# Patient Record
Sex: FEMALE | Race: WHITE | NOT HISPANIC OR LATINO | Employment: UNEMPLOYED | URBAN - METROPOLITAN AREA
[De-identification: names, ages, dates, MRNs, and addresses within clinical notes are randomized per-mention and may not be internally consistent; named-entity substitution may affect disease eponyms.]

---

## 2018-02-22 ENCOUNTER — HISTORICAL (OUTPATIENT)
Dept: ADMINISTRATIVE | Facility: HOSPITAL | Age: 23
End: 2018-02-22

## 2019-06-24 ENCOUNTER — HISTORICAL (OUTPATIENT)
Dept: ADMINISTRATIVE | Facility: HOSPITAL | Age: 24
End: 2019-06-24

## 2019-10-22 ENCOUNTER — HISTORICAL (OUTPATIENT)
Dept: ADMINISTRATIVE | Facility: HOSPITAL | Age: 24
End: 2019-10-22

## 2019-10-23 ENCOUNTER — HISTORICAL (OUTPATIENT)
Dept: FAMILY MEDICINE | Facility: CLINIC | Age: 24
End: 2019-10-23

## 2019-10-23 LAB
ABS NEUT (OLG): 4.77 X10(3)/MCL (ref 2.1–9.2)
ALBUMIN SERPL-MCNC: 4 GM/DL (ref 3.4–5)
ALBUMIN/GLOB SERPL: 1 RATIO (ref 1.1–2)
ALP SERPL-CCNC: 78 UNIT/L (ref 45–117)
ALT SERPL-CCNC: 25 UNIT/L (ref 12–78)
AST SERPL-CCNC: 20 UNIT/L (ref 15–37)
BASOPHILS # BLD AUTO: 0 X10(3)/MCL (ref 0–0.2)
BASOPHILS NFR BLD AUTO: 1 %
BILIRUB SERPL-MCNC: 0.4 MG/DL (ref 0.2–1)
BILIRUBIN DIRECT+TOT PNL SERPL-MCNC: 0.1 MG/DL (ref 0–0.2)
BILIRUBIN DIRECT+TOT PNL SERPL-MCNC: 0.3 MG/DL
BUN SERPL-MCNC: 12 MG/DL (ref 7–18)
CALCIUM SERPL-MCNC: 9.4 MG/DL (ref 8.5–10.1)
CHLORIDE SERPL-SCNC: 106 MMOL/L (ref 98–107)
CHOLEST SERPL-MCNC: 149 MG/DL
CHOLEST/HDLC SERPL: 3.6 {RATIO} (ref 0–4.4)
CO2 SERPL-SCNC: 26 MMOL/L (ref 21–32)
CREAT SERPL-MCNC: 0.8 MG/DL (ref 0.6–1.3)
CRP SERPL HS-MCNC: 9.1 MG/L (ref 0–3)
EOSINOPHIL # BLD AUTO: 0.2 X10(3)/MCL (ref 0–0.9)
EOSINOPHIL NFR BLD AUTO: 3 %
ERYTHROCYTE [DISTWIDTH] IN BLOOD BY AUTOMATED COUNT: 12 % (ref 11.5–14.5)
ERYTHROCYTE [SEDIMENTATION RATE] IN BLOOD: 12 MM/HR (ref 0–20)
EST. AVERAGE GLUCOSE BLD GHB EST-MCNC: 120 MG/DL
GLOBULIN SER-MCNC: 4.2 GM/ML (ref 2.3–3.5)
GLUCOSE SERPL-MCNC: 91 MG/DL (ref 74–106)
HBA1C MFR BLD: 5.8 % (ref 4.2–6.3)
HCT VFR BLD AUTO: 46.6 % (ref 35–46)
HDLC SERPL-MCNC: 41 MG/DL (ref 40–59)
HGB BLD-MCNC: 15.3 GM/DL (ref 12–16)
IMM GRANULOCYTES # BLD AUTO: 0.02 10*3/UL
IMM GRANULOCYTES NFR BLD AUTO: 0 %
LDLC SERPL CALC-MCNC: 78 MG/DL
LYMPHOCYTES # BLD AUTO: 2.2 X10(3)/MCL (ref 0.6–4.6)
LYMPHOCYTES NFR BLD AUTO: 28 %
MCH RBC QN AUTO: 29.9 PG (ref 26–34)
MCHC RBC AUTO-ENTMCNC: 32.8 GM/DL (ref 31–37)
MCV RBC AUTO: 91 FL (ref 80–100)
MONOCYTES # BLD AUTO: 0.5 X10(3)/MCL (ref 0.1–1.3)
MONOCYTES NFR BLD AUTO: 7 %
NEUTROPHILS # BLD AUTO: 4.77 X10(3)/MCL (ref 2.1–9.2)
NEUTROPHILS NFR BLD AUTO: 61 %
PLATELET # BLD AUTO: 263 X10(3)/MCL (ref 130–400)
PMV BLD AUTO: 9.4 FL (ref 7.4–10.4)
POTASSIUM SERPL-SCNC: 3.6 MMOL/L (ref 3.5–5.1)
PROT SERPL-MCNC: 8.2 GM/DL (ref 6.4–8.2)
RBC # BLD AUTO: 5.12 X10(6)/MCL (ref 4–5.2)
SODIUM SERPL-SCNC: 140 MMOL/L (ref 136–145)
T4 FREE SERPL-MCNC: 0.92 NG/DL (ref 0.76–1.46)
TRIGL SERPL-MCNC: 150 MG/DL
TSH SERPL-ACNC: 1.72 MIU/L (ref 0.36–3.74)
VLDLC SERPL CALC-MCNC: 30 MG/DL
WBC # SPEC AUTO: 7.8 X10(3)/MCL (ref 4.5–11)

## 2020-09-14 LAB — POC BETA-HCG (QUAL): NEGATIVE

## 2020-09-15 ENCOUNTER — HISTORICAL (OUTPATIENT)
Dept: ADMINISTRATIVE | Facility: HOSPITAL | Age: 25
End: 2020-09-15

## 2020-09-15 LAB
ABS NEUT (OLG): 3.9 X10(3)/MCL (ref 2.1–9.2)
ALBUMIN SERPL-MCNC: 3.9 GM/DL (ref 3.4–5)
ALBUMIN/GLOB SERPL: 0.9 RATIO (ref 1.1–2)
ALP SERPL-CCNC: 67 UNIT/L (ref 45–117)
ALT SERPL-CCNC: 43 UNIT/L (ref 12–78)
AST SERPL-CCNC: 31 UNIT/L (ref 15–37)
BASOPHILS # BLD AUTO: 0.1 X10(3)/MCL (ref 0–0.2)
BASOPHILS NFR BLD AUTO: 1 %
BILIRUB SERPL-MCNC: 0.5 MG/DL (ref 0.2–1)
BILIRUBIN DIRECT+TOT PNL SERPL-MCNC: 0.1 MG/DL (ref 0–0.2)
BILIRUBIN DIRECT+TOT PNL SERPL-MCNC: 0.4 MG/DL
BUN SERPL-MCNC: 11 MG/DL (ref 7–18)
CALCIUM SERPL-MCNC: 9.3 MG/DL (ref 8.5–10.1)
CHLORIDE SERPL-SCNC: 106 MMOL/L (ref 98–107)
CHOLEST SERPL-MCNC: 160 MG/DL
CHOLEST/HDLC SERPL: 4.2 {RATIO} (ref 0–4.4)
CO2 SERPL-SCNC: 27 MMOL/L (ref 21–32)
CREAT SERPL-MCNC: 0.8 MG/DL (ref 0.6–1.3)
EOSINOPHIL # BLD AUTO: 0.1 X10(3)/MCL (ref 0–0.9)
EOSINOPHIL NFR BLD AUTO: 2 %
ERYTHROCYTE [DISTWIDTH] IN BLOOD BY AUTOMATED COUNT: 12.3 % (ref 11.5–14.5)
GLOBULIN SER-MCNC: 4.2 GM/ML (ref 2.3–3.5)
GLUCOSE SERPL-MCNC: 79 MG/DL (ref 74–106)
HCT VFR BLD AUTO: 44.4 % (ref 35–46)
HDLC SERPL-MCNC: 38 MG/DL (ref 40–59)
HGB BLD-MCNC: 14.5 GM/DL (ref 12–16)
IMM GRANULOCYTES # BLD AUTO: 0.02 10*3/UL
IMM GRANULOCYTES NFR BLD AUTO: 0 %
LDLC SERPL CALC-MCNC: 92 MG/DL
LYMPHOCYTES # BLD AUTO: 2.5 X10(3)/MCL (ref 0.6–4.6)
LYMPHOCYTES NFR BLD AUTO: 34 %
MCH RBC QN AUTO: 29.7 PG (ref 26–34)
MCHC RBC AUTO-ENTMCNC: 32.7 GM/DL (ref 31–37)
MCV RBC AUTO: 90.8 FL (ref 80–100)
MONOCYTES # BLD AUTO: 0.7 X10(3)/MCL (ref 0.1–1.3)
MONOCYTES NFR BLD AUTO: 9 %
NEUTROPHILS # BLD AUTO: 3.9 X10(3)/MCL (ref 2.1–9.2)
NEUTROPHILS NFR BLD AUTO: 54 %
PLATELET # BLD AUTO: 281 X10(3)/MCL (ref 130–400)
PMV BLD AUTO: 9.3 FL (ref 7.4–10.4)
POTASSIUM SERPL-SCNC: 3.5 MMOL/L (ref 3.5–5.1)
PROT SERPL-MCNC: 8.1 GM/DL (ref 6.4–8.2)
RBC # BLD AUTO: 4.89 X10(6)/MCL (ref 4–5.2)
SODIUM SERPL-SCNC: 142 MMOL/L (ref 136–145)
T4 FREE SERPL-MCNC: 1.16 NG/DL (ref 0.76–1.46)
TRIGL SERPL-MCNC: 152 MG/DL
TSH SERPL-ACNC: 2.19 MIU/L (ref 0.36–3.74)
VLDLC SERPL CALC-MCNC: 30 MG/DL
WBC # SPEC AUTO: 7.3 X10(3)/MCL (ref 4.5–11)

## 2020-10-27 ENCOUNTER — HISTORICAL (OUTPATIENT)
Dept: ADMINISTRATIVE | Facility: HOSPITAL | Age: 25
End: 2020-10-27

## 2020-10-27 LAB — CORTIS SERPL-SCNC: 4.8 UG/DL

## 2020-10-28 ENCOUNTER — HISTORICAL (OUTPATIENT)
Dept: FAMILY MEDICINE | Facility: CLINIC | Age: 25
End: 2020-10-28

## 2020-10-29 ENCOUNTER — HISTORICAL (OUTPATIENT)
Dept: ADMINISTRATIVE | Facility: HOSPITAL | Age: 25
End: 2020-10-29

## 2020-10-29 LAB — CORTIS SERPL-SCNC: <1 UG/DL

## 2020-11-10 ENCOUNTER — HISTORICAL (OUTPATIENT)
Dept: RADIOLOGY | Facility: HOSPITAL | Age: 25
End: 2020-11-10

## 2021-01-27 ENCOUNTER — HISTORICAL (OUTPATIENT)
Dept: ADMINISTRATIVE | Facility: HOSPITAL | Age: 26
End: 2021-01-27

## 2021-01-27 LAB
ABS NEUT (OLG): 4.22 X10(3)/MCL (ref 2.1–9.2)
ALBUMIN SERPL-MCNC: 4.3 GM/DL (ref 3.5–5)
ALBUMIN/GLOB SERPL: 1 RATIO (ref 1.1–2)
ALP SERPL-CCNC: 79 UNIT/L (ref 40–150)
ALT SERPL-CCNC: 42 UNIT/L (ref 0–55)
AST SERPL-CCNC: 30 UNIT/L (ref 5–34)
BASOPHILS # BLD AUTO: 0.1 X10(3)/MCL (ref 0–0.2)
BASOPHILS NFR BLD AUTO: 1 %
BILIRUB SERPL-MCNC: 0.4 MG/DL
BILIRUBIN DIRECT+TOT PNL SERPL-MCNC: 0.2 MG/DL (ref 0–0.5)
BILIRUBIN DIRECT+TOT PNL SERPL-MCNC: 0.2 MG/DL (ref 0–0.8)
BUN SERPL-MCNC: 12 MG/DL (ref 7–18.7)
CALCIUM SERPL-MCNC: 10 MG/DL (ref 8.4–10.2)
CHLORIDE SERPL-SCNC: 101 MMOL/L (ref 98–107)
CO2 SERPL-SCNC: 27 MMOL/L (ref 22–29)
CREAT SERPL-MCNC: 0.76 MG/DL (ref 0.55–1.02)
EOSINOPHIL # BLD AUTO: 0.2 X10(3)/MCL (ref 0–0.9)
EOSINOPHIL NFR BLD AUTO: 3 %
ERYTHROCYTE [DISTWIDTH] IN BLOOD BY AUTOMATED COUNT: 12.6 % (ref 11.5–14.5)
GLOBULIN SER-MCNC: 4.1 GM/DL (ref 2.4–3.5)
GLUCOSE SERPL-MCNC: 70 MG/DL (ref 74–100)
HCT VFR BLD AUTO: 47.3 % (ref 35–46)
HGB BLD-MCNC: 15.2 GM/DL (ref 12–16)
IMM GRANULOCYTES # BLD AUTO: 0.04 10*3/UL
IMM GRANULOCYTES NFR BLD AUTO: 0 %
LYMPHOCYTES # BLD AUTO: 3 X10(3)/MCL (ref 0.6–4.6)
LYMPHOCYTES NFR BLD AUTO: 37 %
MCH RBC QN AUTO: 29.2 PG (ref 26–34)
MCHC RBC AUTO-ENTMCNC: 32.1 GM/DL (ref 31–37)
MCV RBC AUTO: 90.8 FL (ref 80–100)
MONOCYTES # BLD AUTO: 0.7 X10(3)/MCL (ref 0.1–1.3)
MONOCYTES NFR BLD AUTO: 8 %
NEUTROPHILS # BLD AUTO: 4.22 X10(3)/MCL (ref 2.1–9.2)
NEUTROPHILS NFR BLD AUTO: 51 %
PLATELET # BLD AUTO: 303 X10(3)/MCL (ref 130–400)
PMV BLD AUTO: 9.7 FL (ref 7.4–10.4)
POTASSIUM SERPL-SCNC: 3.4 MMOL/L (ref 3.5–5.1)
PROT SERPL-MCNC: 8.4 GM/DL (ref 6.4–8.3)
RBC # BLD AUTO: 5.21 X10(6)/MCL (ref 4–5.2)
SODIUM SERPL-SCNC: 141 MMOL/L (ref 136–145)
WBC # SPEC AUTO: 8.2 X10(3)/MCL (ref 4.5–11)

## 2021-02-03 ENCOUNTER — HISTORICAL (OUTPATIENT)
Dept: ADMINISTRATIVE | Facility: HOSPITAL | Age: 26
End: 2021-02-03

## 2021-02-03 LAB
ALBUMIN SERPL-MCNC: 3.9 GM/DL (ref 3.5–5)
ALBUMIN/GLOB SERPL: 1.1 RATIO (ref 1.1–2)
ALP SERPL-CCNC: 69 UNIT/L (ref 40–150)
ALT SERPL-CCNC: 24 UNIT/L (ref 0–55)
AST SERPL-CCNC: 20 UNIT/L (ref 5–34)
BILIRUB SERPL-MCNC: 0.3 MG/DL
BILIRUBIN DIRECT+TOT PNL SERPL-MCNC: 0.1 MG/DL (ref 0–0.5)
BILIRUBIN DIRECT+TOT PNL SERPL-MCNC: 0.2 MG/DL (ref 0–0.8)
BUN SERPL-MCNC: 19 MG/DL (ref 7–18.7)
CALCIUM SERPL-MCNC: 9.1 MG/DL (ref 8.4–10.2)
CHLORIDE SERPL-SCNC: 102 MMOL/L (ref 98–107)
CO2 SERPL-SCNC: 29 MMOL/L (ref 22–29)
CREAT SERPL-MCNC: 0.72 MG/DL (ref 0.55–1.02)
EST CREAT CLEARANCE SER (OHS): 69.22 ML/MIN
GLOBULIN SER-MCNC: 3.4 GM/DL (ref 2.4–3.5)
GLUCOSE SERPL-MCNC: 63 MG/DL (ref 74–100)
HAV IGM SERPL QL IA: NONREACTIVE
HBV CORE IGM SERPL QL IA: NONREACTIVE
HBV SURFACE AG SERPL QL IA: NONREACTIVE
HCV AB SERPL QL IA: NONREACTIVE
NEG CONT SPOT COUNT: NORMAL
PANEL A SPOT COUNT: 0
PANEL B SPOT COUNT: 0
POS CONT SPOT COUNT: NORMAL
POTASSIUM SERPL-SCNC: 3.5 MMOL/L (ref 3.5–5.1)
PROT SERPL-MCNC: 7.3 GM/DL (ref 6.4–8.3)
SODIUM SERPL-SCNC: 141 MMOL/L (ref 136–145)
T-SPOT.TB: NORMAL

## 2021-07-13 ENCOUNTER — HISTORICAL (OUTPATIENT)
Dept: ADMINISTRATIVE | Facility: HOSPITAL | Age: 26
End: 2021-07-13

## 2021-07-13 LAB
ABS NEUT (OLG): 5.36 X10(3)/MCL (ref 2.1–9.2)
BASOPHILS # BLD AUTO: 0.1 X10(3)/MCL (ref 0–0.2)
BASOPHILS NFR BLD AUTO: 1 %
CRP SERPL HS-MCNC: 1.22 MG/DL
DEPRECATED CALCIDIOL+CALCIFEROL SERPL-MC: 27.7 NG/ML (ref 30–80)
EOSINOPHIL # BLD AUTO: 0.3 X10(3)/MCL (ref 0–0.9)
EOSINOPHIL NFR BLD AUTO: 3 %
ERYTHROCYTE [DISTWIDTH] IN BLOOD BY AUTOMATED COUNT: 12 % (ref 11.5–17)
ERYTHROCYTE [SEDIMENTATION RATE] IN BLOOD: 16 MM/HR (ref 0–20)
HBV SURFACE AG SERPL QL IA: NONREACTIVE
HCT VFR BLD AUTO: 42.7 % (ref 37–47)
HCV AB SERPL QL IA: NONREACTIVE
HGB BLD-MCNC: 14.1 GM/DL (ref 12–16)
LYMPHOCYTES # BLD AUTO: 2.7 X10(3)/MCL (ref 0.6–4.6)
LYMPHOCYTES NFR BLD AUTO: 29 %
MCH RBC QN AUTO: 29.8 PG (ref 27–31)
MCHC RBC AUTO-ENTMCNC: 33 GM/DL (ref 33–36)
MCV RBC AUTO: 90.3 FL (ref 80–94)
MONOCYTES # BLD AUTO: 0.8 X10(3)/MCL (ref 0.1–1.3)
MONOCYTES NFR BLD AUTO: 8 %
NEUTROPHILS # BLD AUTO: 5.36 X10(3)/MCL (ref 2.1–9.2)
NEUTROPHILS NFR BLD AUTO: 58 %
PLATELET # BLD AUTO: 282 X10(3)/MCL (ref 130–400)
PMV BLD AUTO: 9.6 FL (ref 9.4–12.4)
RBC # BLD AUTO: 4.73 X10(6)/MCL (ref 4.2–5.4)
RHEUMATOID FACT SERPL-ACNC: <13 IU/ML
WBC # SPEC AUTO: 9.2 X10(3)/MCL (ref 4.5–11.5)

## 2021-10-18 ENCOUNTER — HISTORICAL (OUTPATIENT)
Dept: ADMINISTRATIVE | Facility: HOSPITAL | Age: 26
End: 2021-10-18

## 2021-10-18 LAB
ABS NEUT (OLG): 3.82 X10(3)/MCL (ref 2.1–9.2)
ALBUMIN SERPL-MCNC: 4.3 GM/DL (ref 3.5–5)
ALBUMIN/GLOB SERPL: 1.4 RATIO (ref 1.1–2)
ALP SERPL-CCNC: 73 UNIT/L (ref 40–150)
ALT SERPL-CCNC: 37 UNIT/L (ref 0–55)
AST SERPL-CCNC: 33 UNIT/L (ref 5–34)
BASOPHILS # BLD AUTO: 0 X10(3)/MCL (ref 0–0.2)
BASOPHILS NFR BLD AUTO: 1 %
BILIRUB SERPL-MCNC: 0.3 MG/DL
BILIRUBIN DIRECT+TOT PNL SERPL-MCNC: 0.1 MG/DL (ref 0–0.5)
BILIRUBIN DIRECT+TOT PNL SERPL-MCNC: 0.2 MG/DL (ref 0–0.8)
BUN SERPL-MCNC: 16.4 MG/DL (ref 7–18.7)
CALCIUM SERPL-MCNC: 10.6 MG/DL (ref 8.4–10.2)
CHLORIDE SERPL-SCNC: 104 MMOL/L (ref 98–107)
CO2 SERPL-SCNC: 30 MMOL/L (ref 22–29)
CREAT SERPL-MCNC: 0.76 MG/DL (ref 0.55–1.02)
CRP SERPL HS-MCNC: 0.81 MG/DL
DEPRECATED CALCIDIOL+CALCIFEROL SERPL-MC: 33.3 NG/ML (ref 30–80)
EOSINOPHIL # BLD AUTO: 0.2 X10(3)/MCL (ref 0–0.9)
EOSINOPHIL NFR BLD AUTO: 3 %
ERYTHROCYTE [DISTWIDTH] IN BLOOD BY AUTOMATED COUNT: 12.8 % (ref 11.5–17)
ERYTHROCYTE [SEDIMENTATION RATE] IN BLOOD: 13 MM/HR (ref 0–20)
GLOBULIN SER-MCNC: 3.1 GM/DL (ref 2.4–3.5)
GLUCOSE SERPL-MCNC: 82 MG/DL (ref 74–100)
HCT VFR BLD AUTO: 43.6 % (ref 37–47)
HGB BLD-MCNC: 14.6 GM/DL (ref 12–16)
LYMPHOCYTES # BLD AUTO: 2.7 X10(3)/MCL (ref 0.6–4.6)
LYMPHOCYTES NFR BLD AUTO: 37 %
MCH RBC QN AUTO: 29.9 PG (ref 27–31)
MCHC RBC AUTO-ENTMCNC: 33.5 GM/DL (ref 33–36)
MCV RBC AUTO: 89.3 FL (ref 80–94)
MONOCYTES # BLD AUTO: 0.5 X10(3)/MCL (ref 0.1–1.3)
MONOCYTES NFR BLD AUTO: 7 %
NEUTROPHILS # BLD AUTO: 3.82 X10(3)/MCL (ref 2.1–9.2)
NEUTROPHILS NFR BLD AUTO: 52 %
PLATELET # BLD AUTO: 258 X10(3)/MCL (ref 130–400)
PMV BLD AUTO: 9.7 FL (ref 9.4–12.4)
POTASSIUM SERPL-SCNC: 4 MMOL/L (ref 3.5–5.1)
PROT SERPL-MCNC: 7.4 GM/DL (ref 6.4–8.3)
RBC # BLD AUTO: 4.88 X10(6)/MCL (ref 4.2–5.4)
RHEUMATOID FACT SERPL-ACNC: <13 IU/ML
SODIUM SERPL-SCNC: 144 MMOL/L (ref 136–145)
WBC # SPEC AUTO: 7.3 X10(3)/MCL (ref 4.5–11.5)

## 2022-04-10 ENCOUNTER — HISTORICAL (OUTPATIENT)
Dept: ADMINISTRATIVE | Facility: HOSPITAL | Age: 27
End: 2022-04-10
Payer: MEDICAID

## 2022-04-28 VITALS
WEIGHT: 159.81 LBS | OXYGEN SATURATION: 99 % | BODY MASS INDEX: 35.95 KG/M2 | DIASTOLIC BLOOD PRESSURE: 68 MMHG | HEIGHT: 56 IN | SYSTOLIC BLOOD PRESSURE: 102 MMHG
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
## 2022-05-03 NOTE — HISTORICAL [facility] CERNER
This is a historical note converted from Maurice. Formatting and pictures may have been removed.  Please reference Maurice for original formatting and attached multimedia. Chief Complaint  2 mo follow up lab results  History of Present Illness  Pt is a??25 year old female with a past medical history of hypoglycemia, multiple joint pain sites with positive?RF and ?obesity.?Discussed labs from? 1/27/2021 and XR right knee. Pt states she has not had any hypoglycemic episodes.  Polyarthralgia/ positive RF/ Falls: Pt states that her thigh gave out and she fell on 1/27/2021. Pt states she always has a sensation along the iliotibial band tightening. Pt saw [facility] on 2/2/2021. Reviewed [facility] note in agreement that pt should be seen by Rheumatology because likely rheumatological issue with elevated RF and C3 complement; pt recommended for PT 3x/week to start in 2 wks; and if not rheumatological issue, consider neurology. Pt states that she has fallen 2 -3 time since. Rt knee more swollen then left. Pt had meloxicam this morning with pain 3/10. Pt continues to have swelling of left MCP and?wrist greater than the?right.  Adjustment d/o with anxiety and depression:? She is at  majoring in biology with double minor in chemistry and ecology. ?Patient states that her counselor from [facility] health clinic?suspects that she has a diagnosis of PTSD, depression,?AVILA, panic?disorder hand?possible pseudoseizures. Pt has since seen psychiatrist and has been started on Zoloft 50 mg po daily. Reviewed PHQ 2 was0. Pt denies suicidal or homicidal ideations.  Patient denies chest pain, shortness of breath,?dyspnea on exertion, palpitations, peripheral edema,?abdominal pain, nausea, vomiting,?diarrhea,?constipation, fatigue, fever, chills,?dysuria,? hematuria, melena,?or hematochezia.  Review of Systems  GENERAL:?Negative for abnormal weight loss or weight gain, fatigue, fever, or chills. Negative except for stated in  HPI.  HEENT:??Negative for head trauma,?blurred vision, rhinorrhea, ?nasal congestion, sore throat or hearing deficit. Negative except for stated in HPI.  CARDIOVASCULAR: Negative for? chest pain, palpitations, BARTLETT, PND, orthopnea, peripheral edema or syncope. Negative except for stated in HPI.  RESPIRATORY: Negative for SOB, BARTLETT, cough or?wheezing.? Negative except for stated in HPI.  GASTROINTESTINAL: Negative for abdominal pain, nausea,??vomiting, diarrhea, constipation, heartburn, ?hematochezia? or melena.? Negative except for stated in HPI.  GENITOURINARY: Negative for dysuria, hematuria, urinary frequency, urinary urgency, urinary hesitancy or flank pain. Negative except for stated in HPI.  ENDOCRINE: Negative for fatigue, heat intolerance, cold intolerance, polyuria, polydipsia, or polyphagia.? Negative except for stated in HPI.  PSYCHOLOGICAL: Negative for depression, anxiety, suicidal or homicidal ideations, hallucinations??or?hanane.? Negative except for?stated in HPI.  MUSCULOSKELETAL:?? Negative except for?stated in HPI.  INTEGUMENT: Negative for rash?or lesions. Negative except for stated in HPI.  NEUROLOGY: Negative for headaches, dizziness, numbness, tingling,?vertigo?or gait disturbances. Negative except for stated in HPI.  Rest of 12 point ROS is negative except for HPI.  ?  Physical Exam  Vitals & Measurements  T:?36.6? ?C (Oral)? HR:?65(Peripheral)? RR:?18? BP:?100/68? SpO2:?99%?  HT:?142.00?cm? WT:?76.000?kg? BMI:?37.69? LMP:?11/28/2021 00:00 CST?  GENERAL:?Alert and oriented to person, place, time and situation,?NAD  HEENT: NCAT, Pupils equally round and reactive to light accommodation,?normal sclera, ?moist mucous membranes,?oropharynx normal,?normal nasal turbinates/ nares,??TM clear bilaterally, neck?supple,?thyroid normal,?no lymphadenopathy.  CARDIOVASCULAR:?Regular rateand?rhythm,? S1 and S2 normal;?no murmurs, no rubs, or no gallops; no carotid bruit.  RESPIRATORY:??Lungs clear to  auscultation bilaterally;?no wheezes,?no rhonchi,?or? no crackles  ABDOMEN: Soft/ Nontender/ Nondistended; Bowel Sounds x4 - normoactive; no abdominal pulsatile mass, no masses, no hernias  EXTREMITY:? No clubbing, no cyanosis and?no peripheral edema; Dorsalis pedis and tibial ?pulses 2+  MUSCULOSKELETAL: FROM of Upper and Lower extremities; Strength 5/5 of Upper and Lower extremities; right knee with slight swelling, non erythematous, stiffness to movement/ LROM on flexion; no signs/ symptoms of infection;? Left knee: FROM;? right? and Left MCP jts b/l with swelling and TTP ?with right greater than the left and right? radial styloid tenderness.  PSYCHOLOGICAL: Good judgement and insight; normal affect and mood.  NEUROLOGICAL: Normal gait and ?normal sensation;?no focal deficits; Cranial Nerves 2 -12 grossly intact  ?  Assessment/Plan  1.?Rheumatoid factor positive?R76.8  Suspected RA vs other differential- Autoimmune arthropathy, neuromuscular disorder, etc  Chronic issue/ requiring further work up  ?RF IgG and IGM are increasing and 2 times the normal limit from initial evaluation on?9/15/2020 to 10/27/2020.  KIMBERLEE and anti CCP ab both negative.  Discontinue ibuprofen 600mg po BID prn pain  Start Meloxicam 15 mg po daily with food and plenty of water  Continue Tylenol 1 g?p.o. twice daily as needed  Keep appointment with physical therapy once initiated  CMP ordered  Tspot? ordered  Hepatitis panel ordered  XR Rt knee (1/28/2021)- unremarkable;No acute abnormality identified.  XR right and left hand ordered  Ambulatory referral to Rheumatology Dr. Braden for RF positive and polyarthropathy submitted 12/3/2020.  Ordered:  Comprehensive Metabolic Panel, Routine collect, *Est. 02/03/21 3:00:00 CST, Blood, Order for future visit, *Est. Stop date 02/03/21 3:00:00 CST, Lab Collect, Rheumatoid factor positive, 02/03/21 10:53:00 CST  Hepatitis Panel-, Routine collect, 02/03/21 10:53:00 CST, Blood, Order for future visit,  Stop date 02/03/21 10:53:00 CST, Lab Collect, Rheumatoid factor positive, 02/03/21 10:53:00 CST  T Spot Test for M. tuberculosis, Routine collect, 02/03/21 10:53:00 CST, Blood, Order for future visit, Stop date 02/03/21 10:53:00 CST, Lab Collect, Rheumatoid factor positive, 02/03/21 10:53:00 CST  ?  2.?Polyarthropathy?M13.0  Chronic issue  ?RF IgG and IGM are increasing and 2 times the normal limit from initial evaluation on?9/15/2020 to 10/27/2020.  KIMBERLEE and anti CCP ab both negative.  Discontinue ibuprofen 600mg po BID prn pain  Start Meloxicam 15 mg po daily with food and plenty of water  Continue Tylenol 1 g?p.o. twice daily as needed  XR Rt knee (1/28/2021)- unremarkable;No acute abnormality identified.  XR right and left hand ordered  Start PT recommended by [facility]  Will continue to monitor  If falls continue, will consider neurology referral  Ordered:  XR Hand Left Minimum 3 Views, Routine, 02/03/21 10:51:00 CST, None, Ambulatory, Rad Type, Order for future visit, Polyarthropathy, Not Scheduled, 02/03/21 10:51:00 CST  XR Hand Right Minimum 3 Views, Routine, 02/03/21 10:52:00 CST, None, Ambulatory, Rad Type, Order for future visit, Polyarthropathy, Not Scheduled, 02/03/21 10:52:00 CST  ?  Orders:  meloxicam, 15 mg = 1 tab(s), Oral, Daily, # 30 tab(s), 5 Refill(s), Pharmacy: [id number], 142, cm, Height/Length Dosing, 02/03/21 9:01:00 CST, 76, kg, Weight Dosing, 02/03/21 9:01:00 CST  RTC in? 2 months for RF+ suspected RA and polyarthropathy  labs resulted after visit- Hep panel NR and Tspot negative; will inform pt at next visit   Problem List/Past Medical History  Ongoing  Allergic reaction to latex  Bilateral hip pain  Hypoglycemia  Obesity  Rheumatoid factor positive  Historical  No qualifying data  Procedure/Surgical History  None   Medications  EPINEPHrine 0.3 mg injectable kit, 0.3 mg= 0.3 mg, IM, Once  hydrOXYzine hydrochloride 25 mg oral tablet, 25 mg= 1 tab(s), Oral,  QID  melatonin 10 mg oral tablet, disintegrating, 10 mg= 1 tab(s), Oral, Once a day (at bedtime)  meloxicam 15 mg oral tablet, 15 mg= 1 tab(s), Oral, Daily, 5 refills  sertraline 50 mg oral tablet, 50 mg= 1 tab(s), Oral, qAM  Allergies  Latex  Silicone  Social History  Abuse/Neglect  No, No, Yes, 02/03/2021  Alcohol  Never, 07/06/2016  Employment/School  Student, 10/22/2019  Exercise  Exercise duration: 15. Exercise frequency: 1-2 times/week. Exercise type: Walking., 11/09/2020  Financial/Legal Situation  None, 09/14/2020  Home/Environment  Lives with Mother, Siblings. Living situation: Home/Independent., 10/22/2019    Never in , 09/14/2020  Nutrition/Health  Regular, Good, 10/22/2019  Sexual  Sexually active: No. Sexual orientation: Straight or heterosexual. Gender Identity Identifies as female. Other contraceptive use: none. No, 10/22/2019  Spiritual/Cultural  Agnostic, 11/09/2020  Substance Use  Never, 07/15/2016  Tobacco  Never (less than 100 in lifetime), N/A, 02/03/2021  Family History  ADHD - Attention deficit disorder with hyperactivity: Father.  Autistic disorder: Brother.  Dyslexia: Father and Brother.  Osteoarthritis: Mother.  Schizophrenic disorders: Brother.  Health Maintenance  Health Maintenance  ???Pending?(in the next year)  ??? ??OverDue  ??? ? ? ?Influenza Vaccine due??10/01/20??and every 1??day(s)  ??? ??Due?  ??? ? ? ?Cervical Cancer Screening due??02/03/21??Unknown Frequency  ??? ??Refused?  ??? ? ? ?Tetanus Vaccine due??02/03/21??and every 10??year(s)  ??? ??Due In Future?  ??? ? ? ?ADL Screening not due until??11/09/21??and every 1??year(s)  ??? ? ? ?Obesity Screening not due until??01/01/22??and every 1??year(s)  ??? ? ? ?Alcohol Misuse Screening not due until??01/02/22??and every 1??year(s)  ??? ? ? ?Diabetes Screening not due until??01/27/22??and every 1??year(s)  ???Satisfied?(in the past 1 year)  ??? ??Satisfied?  ??? ? ? ?ADL Screening on??11/09/20.??Satisfied by Marco  Tigist BROWN  ??? ? ? ?Alcohol Misuse Screening on??01/27/21.??Satisfied by Nicol March LPN  ??? ? ? ?Blood Pressure Screening on??02/03/21.??Satisfied by Fanta Catherine LPN  ??? ? ? ?Body Mass Index Check on??02/03/21.??Satisfied by Fanta Catherine LPN  ??? ? ? ?Depression Screening on??02/03/21.??Satisfied by Fanta Catherine LPN  ??? ? ? ?Diabetes Screening on??01/27/21.??Satisfied by Shad Beach  ??? ? ? ?Influenza Vaccine on??02/03/21.??Satisfied by Fanta Catherine LPN  ??? ? ? ?Obesity Screening on??02/03/21.??Satisfied by Fanta Catherine LPN  ??? ??Refused?  ??? ? ? ?Tetanus Vaccine on??10/27/20.??Recorded by Fanta Catherine LPN??Reason: Patient Refuses  ?  Lab Results  Test Name Test Result Date/Time Comments   Sodium Lvl 141 mmol/L 01/27/2021 10:00 CST    Potassium Lvl 3.4 mmol/L (Low) 01/27/2021 10:00 CST    Chloride 101 mmol/L 01/27/2021 10:00 CST    CO2 27 mmol/L 01/27/2021 10:00 CST    Calcium Lvl 10.0 mg/dL 01/27/2021 10:00 CST    Glucose Lvl 70 mg/dL (Low) 01/27/2021 10:00 CST    BUN 12.0 mg/dL 01/27/2021 10:00 CST    Creatinine 0.76 mg/dL 01/27/2021 10:00 CST    eGFR-AA >105 01/27/2021 10:00 CST    eGFR-ZACARIAS 100 mL/min/1.73 m2 01/27/2021 10:00 CST    Bili Total 0.4 mg/dL 01/27/2021 10:00 CST    Bili Direct 0.2 mg/dL 01/27/2021 10:00 CST    Bili Indirect 0.20 mg/dL 01/27/2021 10:00 CST    AST 30 unit/L 01/27/2021 10:00 CST    ALT 42 unit/L 01/27/2021 10:00 CST    Alk Phos 79 unit/L 01/27/2021 10:00 CST    Total Protein 8.4 gm/dL (High) 01/27/2021 10:00 CST    Albumin Lvl 4.3 gm/dL 01/27/2021 10:00 CST    Globulin 4.1 gm/dL (High) 01/27/2021 10:00 CST    A/G Ratio 1.0 ratio (Low) 01/27/2021 10:00 CST    WBC 8.2 x10(3)/mcL 01/27/2021 10:00 CST    RBC 5.21 x10(6)/mcL (High) 01/27/2021 10:00 CST    Hgb 15.2 gm/dL 01/27/2021 10:00 CST    Hct 47.3 % (High) 01/27/2021 10:00 CST    Platelet 303 x10(3)/mcL 01/27/2021 10:00 CST    MCV 90.8 fL 01/27/2021 10:00 CST    MCH 29.2 pg 01/27/2021 10:00 CST    MCHC 32.1  gm/dL 01/27/2021 10:00 CST    RDW 12.6 % 01/27/2021 10:00 CST    MPV 9.7 fL 01/27/2021 10:00 CST    Abs Neut 4.22 x10(3)/mcL 01/27/2021 10:00 CST    Neutro Auto 51 % 01/27/2021 10:00 CST    Lymph Auto 37 % 01/27/2021 10:00 CST    Mono Auto 8 % 01/27/2021 10:00 CST    Eos Auto 3 % 01/27/2021 10:00 CST    Abs Eos 0.2 x10(3)/mcL 01/27/2021 10:00 CST    Basophil Auto 1 % 01/27/2021 10:00 CST    Abs Neutro 4.22 x10(3)/mcL 01/27/2021 10:00 CST    Abs Lymph 3.0 x10(3)/mcL 01/27/2021 10:00 CST    Abs Mono 0.7 x10(3)/mcL 01/27/2021 10:00 CST    Abs Baso 0.1 x10(3)/mcL 01/27/2021 10:00 CST    IG% 0 % 01/27/2021 10:00 CST    IG# 0.040 01/27/2021 10:00 CST    KIMBERLEE IgG-ARUP None Detected 01/27/2021 10:00 CST ?  If suspicion of connective tissue disease is strong and KIMBERLEE  EIA is negative, consider testing for KIMBERLEE by IFA ([id number]).  INTERPRETIVE INFORMATION: Anti-Nuclear Antibodies (KIMBERLEE),  IgG by BOB  Antinuclear Antibodies (KIMBERLEE), IgG by BOB: KIMBERLEE specimens  are screened using enzyme-linked immunosorbent assay  (BOB) methodology. All BOB results reported as Detected  are further tested by indirect fluorescent assay (IFA)  using HEp-2 substrate with an IgG-specific conjugate. The  KIMBERLEE BOB screen is designed to detect antibodies against  dsDNA, histones, SS-A (Ro), SS-B (La), Patel, Patel/RNP,  Scl-70, Magda-1, centromeric proteins, other antigens  extracted from the HEp-2 cell nucleus. KIMBERLEE BOB assays  have been reported to have lower sensitivities than KIMBERLEE IFA  for systemic autoimmune rheumatic diseases (SARD).  Negative results do not necessarily rule out SARD.  Performed By: [facility]  [address] [place] [id number]  : Huma Berumen MD   Hep A IgM Nonreactive 02/03/2021 11:15 CST    Hep B Core IgM Nonreactive 02/03/2021 11:15 CST    Hep Bs Ag Nonreactive 02/03/2021 11:15 CST    Hep C Ab Nonreactive 02/03/2021 11:15 CST Interpretive Information (HCV):  ? ? ? ? ? ? ? ?Nonreactive: ?  ?Antibodies to HCV not detected.  ? ? ? ? ? ? ? ?Reactive: ? ? ? ?A reactive result should be followed by nucleic acid testing for  ? ? ? ? ? ? ? ? ?HCV RNA for identifying current hepatitis C virus(HCV) infection.  ? ? ? ? ? ? ? ?Equivocal: ? ? ?Another specimen should be obtained from patient for further testing.   RF IgA-ARUP 2 Units 01/27/2021 10:00 CST INTERPRETIVE INFORMATION: Rheumatoid Factor, IgA by BOB  The presence of all three rheumatoid factor (RF) isotypes  at abnormal levels has high specificity for a diagnosis of  rheumatoid arthritis (RA). However, the presence of RF  isotypes in any combination may be found in a variety of  conditions, including Sjogren syndrome and hepatitis  infections.   RF IgG-ARUP 13 Units (High) 01/27/2021 10:00 CST INTERPRETIVE INFORMATION: Rheumatoid Factor, IgG by BOB  The presence of all three rheumatoid factor (RF) isotypes  at abnormal levels has high specificity for a diagnosis of  rheumatoid arthritis (RA). However, the presence of RF  isotypes in any combination may be found in a variety of  conditions, including Sjogren syndrome and hepatitis  infections.  Performed By: [facility]  [address] [place] [id number]  : Huma Berumen MD   RF IgM-ARUP 14 Units (High) 01/27/2021 10:00 CST INTERPRETIVE INFORMATION: Rheumatoid Factor, IgM by BOB  The presence of all three rheumatoid factor (RF) isotypes  at abnormal levels has high specificity for a diagnosis of  rheumatoid arthritis (RA). However, the presence of RF  isotypes in any combination may be found in a variety of  conditions, including Sjogren syndrome and hepatitis  infections.   T-Spot.TB NEG, see scan 02/03/2021 11:15 CST Limitations (from T-SPOT.TB package insert, p.15)  Results from T-SPOT.TB testing must be used in conjunction with each individuals epidemiological history, current medical status and results of other diagnostic evaluation.  ?  The performance of  T-SPOT.TB has not been adequately evaluated with specimens from individuals younger than 17 yrs, in pregnant women, and in patients with hemophilia.  ?  A false positive result was obtained for T-SPOT.TB when tested with M.xenopi, M.kansasii and M.gordonae. ?While ESAT-6 and CFP10 antigens are absent from BCG strains of M.bovis and from most enviromental mycobacteria, it is possible that a positive T-SPOT.TB result may be due to infection with M.kansaii, M.szulgai, M.gordonae or M.marinum. ?Alternative tests would be required if these infections are suspected.  ?  A negative test result does not exclude the possibility of exposure to, or infection with M.tuberculosis. ?Patients with recet exposure to TB infected individuals exhibiting a negative T-SPOT.MB result should be considered for retesting within 6 wks or if other relevant clinical symptoms indicate possible infection.  ?  A positive test result does not rule in active TB disease; other tests should be performed to confirm the diagnosis of active TB disease such as sputum smear and culture, PCR, and chest radiograph.  T-SPOT.TB has not been evaluated in subjects who have received > 1 month of anti-TB therapy.  Refrigerated and frozen samples are not recommended for use with T-SPOT.TB test.   Neg Cont Spot count Passed 02/03/2021 11:15 CST    Panel A Spot Count 0 02/03/2021 11:15 CST    Panel B Spot Count 0 02/03/2021 11:15 CST    Pos Cont Spot Count Passed 02/03/2021 11:15 CST    Diagnostic Results  ?  (01/27/2021 20:33 CST XR Knee Right 1 or 2 Views)  ?  Reason For Exam  Fell yesterday now with right hip and knee pain  ?  Radiology Report  EXAMINATION:  XR Knee Right 1 or 2 Views  ?  INDICATION:  Fell yesterday now with right hip and knee pain  ?  Comparison: None available  ?  FINDINGS:  There is no acute fracture or malalignment. There is no knee joint  effusion. The soft tissues are unremarkable.  ?  ?  IMPRESSION:  No acute abnormality  identified.  ?  Signature Line  Electronically Signed By: Evie Zavaleta MD  Date/Time Signed: 01/28/2021 09:24  ?  ?  This document has an image [1]  ?  (01/27/2021 20:34 CST XR Hip Right 2 Views)  * Final Report *  ?  Reason For Exam  Fell yesterday. ?Now with right hip and knee pain  ?  Radiology Report  EXAMINATION:  XR Hip Right 2 Views  ?  INDICATION:  Fell yesterday. ?Now with right hip and knee pain  ?  Comparison: 6/24/2019  ?  FINDINGS:  There is no displaced fracture identified. The hip joint spaces  preserved. The soft tissues are unremarkable.  ?  ?  IMPRESSION:  No displaced fracture identified.  ?  Signature Line  Electronically Signed By: Evie Zavaleta MD  Date/Time Signed: 01/28/2021 09:24  ?  ?  This document has an image  ? [2]     [1]?XR Knee Right 1 or 2 Views; Evie Zavaleta MD 01/27/2021 20:33 CST  [2]?XR Hip Right 2 Views; Evie Zavaleta MD 01/27/2021 20:34 CST

## 2022-08-05 RX ORDER — OMEPRAZOLE 40 MG/1
CAPSULE, DELAYED RELEASE ORAL
Qty: 30 CAPSULE | Refills: 5 | Status: SHIPPED | OUTPATIENT
Start: 2022-08-05 | End: 2023-10-19

## 2022-09-17 ENCOUNTER — HISTORICAL (OUTPATIENT)
Dept: ADMINISTRATIVE | Facility: HOSPITAL | Age: 27
End: 2022-09-17
Payer: MEDICAID

## 2023-10-22 PROBLEM — Z76.89 ESTABLISHING CARE WITH NEW DOCTOR, ENCOUNTER FOR: Status: ACTIVE | Noted: 2023-10-22

## 2023-10-22 PROBLEM — Z13.31 POSITIVE DEPRESSION SCREENING: Status: ACTIVE | Noted: 2023-10-22

## 2023-10-22 PROBLEM — Z87.39 HISTORY OF RHEUMATOID ARTHRITIS: Status: ACTIVE | Noted: 2023-10-22
